# Patient Record
Sex: MALE | Race: WHITE | URBAN - METROPOLITAN AREA
[De-identification: names, ages, dates, MRNs, and addresses within clinical notes are randomized per-mention and may not be internally consistent; named-entity substitution may affect disease eponyms.]

---

## 2017-01-10 ENCOUNTER — OFFICE VISIT (OUTPATIENT)
Dept: FAMILY MEDICINE | Facility: CLINIC | Age: 72
End: 2017-01-10
Payer: COMMERCIAL

## 2017-01-10 VITALS
DIASTOLIC BLOOD PRESSURE: 78 MMHG | BODY MASS INDEX: 24.8 KG/M2 | TEMPERATURE: 96.6 F | SYSTOLIC BLOOD PRESSURE: 120 MMHG | HEART RATE: 71 BPM | WEIGHT: 156 LBS | OXYGEN SATURATION: 98 %

## 2017-01-10 DIAGNOSIS — Z23 NEED FOR VACCINATION WITH 13-POLYVALENT PNEUMOCOCCAL CONJUGATE VACCINE: ICD-10-CM

## 2017-01-10 DIAGNOSIS — Z71.89 ADVANCED DIRECTIVES, COUNSELING/DISCUSSION: ICD-10-CM

## 2017-01-10 DIAGNOSIS — E78.5 HYPERLIPIDEMIA WITH TARGET LDL LESS THAN 100: ICD-10-CM

## 2017-01-10 DIAGNOSIS — I10 ESSENTIAL HYPERTENSION WITH GOAL BLOOD PRESSURE LESS THAN 140/90: Primary | ICD-10-CM

## 2017-01-10 PROCEDURE — 99214 OFFICE O/P EST MOD 30 MIN: CPT | Mod: 25 | Performed by: FAMILY MEDICINE

## 2017-01-10 PROCEDURE — G0009 ADMIN PNEUMOCOCCAL VACCINE: HCPCS | Performed by: FAMILY MEDICINE

## 2017-01-10 PROCEDURE — 90670 PCV13 VACCINE IM: CPT | Performed by: FAMILY MEDICINE

## 2017-01-10 RX ORDER — HYDROCHLOROTHIAZIDE 25 MG/1
25 TABLET ORAL DAILY
Qty: 90 TABLET | Refills: 1 | Status: SHIPPED | OUTPATIENT
Start: 2017-01-10 | End: 2017-07-10

## 2017-01-10 RX ORDER — LOSARTAN POTASSIUM 25 MG/1
25 TABLET ORAL DAILY
Qty: 90 TABLET | Refills: 1 | Status: SHIPPED | OUTPATIENT
Start: 2017-01-10 | End: 2017-07-10

## 2017-01-10 RX ORDER — AMLODIPINE BESYLATE 10 MG/1
10 TABLET ORAL DAILY
Qty: 90 TABLET | Refills: 1 | Status: SHIPPED | OUTPATIENT
Start: 2017-01-10 | End: 2017-07-10

## 2017-01-10 RX ORDER — SIMVASTATIN 20 MG
20 TABLET ORAL AT BEDTIME
Qty: 90 TABLET | Refills: 1 | Status: SHIPPED | OUTPATIENT
Start: 2017-01-10 | End: 2017-07-10

## 2017-01-10 NOTE — NURSING NOTE
Screening Questionnaire for Adult Immunization    Are you sick today?   No   Do you have allergies to medications, food, a vaccine component or latex?   Yes   Have you ever had a serious reaction after receiving a vaccination?   No   Do you have a long-term health problem with heart disease, lung disease, asthma, kidney disease, metabolic disease (e.g. diabetes), anemia, or other blood disorder?  No   Do you have cancer, leukemia, HIV/AIDS, or any other immune system problem?   No   In the past 3 months, have you taken medications that affect  your immune system, such as prednisone, other steroids, or anticancer drugs; drugs for the treatment of rheumatoid arthritis, Crohn s disease, or psoriasis; or have you had radiation treatments?   No   Have you had a seizure, or a brain or other nervous system problem?   Cerebral Artery Occlusion with Infarction   During the past year, have you received a transfusion of blood or blood     products, or been given immune (gamma) globulin or antiviral drug?   No   For women: Are you pregnant or is there a chance you could become        pregnant during the next month?   No   Have you received any vaccinations in the past 4 weeks?   No     Immunization questionnaire was positive for at least one answer.  Dr. Carrillo aware. On patients problem list.      MNVFC doesn't apply on this patient    Per orders of Fullfillment injection of Prevnar 13 given by Fang Mcdaniels. Patient instructed to remain in clinic for 20 minutes afterwards, and to report any adverse reaction to me immediately.       Screening performed by Fang Mcdaniels on 1/10/2017 at 10:07 AM.

## 2017-01-10 NOTE — NURSING NOTE
"Chief Complaint   Patient presents with     Hypertension     Lipids       Initial /78 mmHg  Pulse 71  Temp(Src) 96.6  F (35.9  C) (Oral)  Wt 156 lb (70.761 kg)  SpO2 98% Estimated body mass index is 24.8 kg/(m^2) as calculated from the following:    Height as of 9/8/16: 5' 6.5\" (1.689 m).    Weight as of this encounter: 156 lb (70.761 kg).  BP completed using cuff size: regular      Fang Mcdaniels MA    "

## 2017-01-10 NOTE — PROGRESS NOTES
SUBJECTIVE:                                                    José Luis Harrison is a 71 year old male who presents to clinic today for the following health issues:      Hyperlipidemia Follow-Up      Rate your low fat/cholesterol diet?: not monitoring fat    Taking statin?  Yes, no muscle aches from statin     Other lipid medications/supplements?:  none     Hypertension Follow-up      Outpatient blood pressures are not being checked.    Low Salt Diet: low salt       Amount of exercise or physical activity: None    Problems taking medications regularly: No    Medication side effects: None    Diet: Regular (no restrictions)    SUBJECTIVE:   - Intermittent right sided shoulder pain last couple of weeks, with pain lasting around a minute. Has not tried anything to relieve the pain.     Problem list and histories reviewed & adjusted, as indicated.  Additional history: as documented    Problem list, Medication list, Allergies, and Medical/Social/Surgical histories reviewed in EPIC and updated as appropriate.    ROS:  Constitutional, HEENT, cardiovascular, pulmonary, GI, , musculoskeletal, neuro, skin, endocrine and psych systems are negative, except as otherwise noted.    OBJECTIVE:                                                    /78 mmHg  Pulse 71  Temp(Src) 96.6  F (35.9  C) (Oral)  Wt 156 lb (70.761 kg)  SpO2 98%  Body mass index is 24.8 kg/(m^2).  GENERAL: healthy, alert and no distress  EYES: Eyes grossly normal to inspection, PERRL and conjunctivae and sclerae normal  NECK: no adenopathy, no asymmetry, masses, or scars and thyroid normal to palpation  RESP: lungs clear to auscultation - no rales, rhonchi or wheezes  CV: regular rate and rhythm, normal S1 S2, no S3 or S4, no murmur, click or rub, no peripheral edema and peripheral pulses strong  ABDOMEN: soft, nontender, no hepatosplenomegaly, no masses and bowel sounds normal  MS: no gross musculoskeletal defects noted, no edema    Diagnostic Test  Results:  none      SUBJECTIVE:  71 year oldyear old male enters with a hx of hypretension.  Pt. Has been compliant with medications and medications were reviewed.  No side effects. No chest pain or sob. Low sodium diet.    Current outpatient prescriptions:      losartan (COZAAR) 25 MG tablet, Take 1 tablet (25 mg) by mouth daily, Disp: 90 tablet, Rfl: 1     amLODIPine (NORVASC) 10 MG tablet, Take 1 tablet (10 mg) by mouth daily, Disp: 90 tablet, Rfl: 1     simvastatin (ZOCOR) 20 MG tablet, Take 1 tablet (20 mg) by mouth At Bedtime, Disp: 90 tablet, Rfl: 1     hydrochlorothiazide (HYDRODIURIL) 25 MG tablet, Take 1 tablet (25 mg) by mouth daily, Disp: 90 tablet, Rfl: 1     cyclobenzaprine (FLEXERIL) 10 MG tablet, Take 0.5-1 tablets (5-10 mg) by mouth 3 times daily as needed for muscle spasms, Disp: 30 tablet, Rfl: 1     aspirin 325 MG EC tablet, Take 325 mg by mouth daily., Disp: , Rfl:      [DISCONTINUED] losartan (COZAAR) 25 MG tablet, Take 1 tablet (25 mg) by mouth daily, Disp: 90 tablet, Rfl: 1     [DISCONTINUED] amLODIPine (NORVASC) 10 MG tablet, Take 1 tablet (10 mg) by mouth daily, Disp: 90 tablet, Rfl: 1     [DISCONTINUED] simvastatin (ZOCOR) 20 MG tablet, Take 1 tablet (20 mg) by mouth At Bedtime, Disp: 90 tablet, Rfl: 1     [DISCONTINUED] hydrochlorothiazide (HYDRODIURIL) 25 MG tablet, Take 1 tablet (25 mg) by mouth daily, Disp: 90 tablet, Rfl: 1  Past Medical History   Diagnosis Date     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     Unspecified cerebral artery occlusion with cerebral infarction      TB (pulmonary tuberculosis) 2010     Hypertension goal BP (blood pressure) < 140/90 4/10/2013     Office Visit on 06/06/2016   Component Date Value Ref Range Status     Cholesterol 06/06/2016 176  <200 mg/dL Final     Triglycerides 06/06/2016 51  <150 mg/dL Final    Fasting specimen     HDL Cholesterol 06/06/2016 77  >39 mg/dL Final     LDL Cholesterol Calculated 06/06/2016 89  <100 mg/dL Final     Desirable:       <100 mg/dl     Non HDL Cholesterol 06/06/2016 99  <130 mg/dL Final     Creatinine Urine 06/06/2016 5   Final     Albumin Urine mg/L 06/06/2016 <5   Final     Albumin Urine mg/g Cr 06/06/2016 Unable to calculate due to low value  0 - 17 mg/g Cr Final     Sodium 06/06/2016 138  133 - 144 mmol/L Final     Potassium 06/06/2016 3.8  3.4 - 5.3 mmol/L Final     Chloride 06/06/2016 103  94 - 109 mmol/L Final     Carbon Dioxide 06/06/2016 26  20 - 32 mmol/L Final     Anion Gap 06/06/2016 9  3 - 14 mmol/L Final     Glucose 06/06/2016 102* 70 - 99 mg/dL Final     Urea Nitrogen 06/06/2016 21  7 - 30 mg/dL Final     Creatinine 06/06/2016 0.97  0.66 - 1.25 mg/dL Final     GFR Estimate 06/06/2016 77  >60 mL/min/1.7m2 Final    Non  GFR Calc     GFR Estimate If Black 06/06/2016   >60 mL/min/1.7m2 Final                    Value:>90   GFR Calc       Calcium 06/06/2016 8.7  8.5 - 10.1 mg/dL Final      Reviewed health maintenance  Patient Active Problem List   Diagnosis     Heart murmur     Corneal opacity     Advanced directives, counseling/discussion     Transient cerebral ischemia     Tuberculosis, treated     Hyperlipidemia with target LDL less than 100     Tobacco abuse     Cerebral artery occlusion with cerebral infarction (H)     TB (pulmonary tuberculosis)     Essential hypertension     Essential hypertension with goal blood pressure less than 140/90         OBJECTIVE:  no apparent distress  /78 mmHg  Pulse 71  Temp(Src) 96.6  F (35.9  C) (Oral)  Wt 156 lb (70.761 kg)  SpO2 98%     Head: Normocephalic. No masses, lesions, tenderness or abnormalities.  Neck::Neck supple. No adenopathy. Thyroid symmetric, normal size.    Cardiovascular: negative. No lifts, heaves, or thrills. RRR. No murmurs, clicks gallops or rubs  Respiratory. Good diaphragmatic excursion. Lungs clear  Gastrointestinal:Abdomen soft, non-tender.  No masses, organomegaly    Office Visit on 06/06/2016    Component Date Value Ref Range Status     Cholesterol 06/06/2016 176  <200 mg/dL Final     Triglycerides 06/06/2016 51  <150 mg/dL Final    Fasting specimen     HDL Cholesterol 06/06/2016 77  >39 mg/dL Final     LDL Cholesterol Calculated 06/06/2016 89  <100 mg/dL Final    Desirable:       <100 mg/dl     Non HDL Cholesterol 06/06/2016 99  <130 mg/dL Final     Creatinine Urine 06/06/2016 5   Final     Albumin Urine mg/L 06/06/2016 <5   Final     Albumin Urine mg/g Cr 06/06/2016 Unable to calculate due to low value  0 - 17 mg/g Cr Final     Sodium 06/06/2016 138  133 - 144 mmol/L Final     Potassium 06/06/2016 3.8  3.4 - 5.3 mmol/L Final     Chloride 06/06/2016 103  94 - 109 mmol/L Final     Carbon Dioxide 06/06/2016 26  20 - 32 mmol/L Final     Anion Gap 06/06/2016 9  3 - 14 mmol/L Final     Glucose 06/06/2016 102* 70 - 99 mg/dL Final     Urea Nitrogen 06/06/2016 21  7 - 30 mg/dL Final     Creatinine 06/06/2016 0.97  0.66 - 1.25 mg/dL Final     GFR Estimate 06/06/2016 77  >60 mL/min/1.7m2 Final    Non  GFR Calc     GFR Estimate If Black 06/06/2016   >60 mL/min/1.7m2 Final                    Value:>90   GFR Calc       Calcium 06/06/2016 8.7  8.5 - 10.1 mg/dL Final         ICD-10-CM    1. Essential hypertension with goal blood pressure less than 140/90 I10 losartan (COZAAR) 25 MG tablet     amLODIPine (NORVASC) 10 MG tablet     hydrochlorothiazide (HYDRODIURIL) 25 MG tablet   2. Hyperlipidemia with target LDL less than 100 E78.5 simvastatin (ZOCOR) 20 MG tablet    PLAN: Follow up in 6 months           SUBJECTIVE:  71 year old enters for recheck of high cholesterol.  Pt. Has been taking med and has no side effects. Pt is following diet.  Denies chest pain and SOB.  Past Medical History   Diagnosis Date     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     Unspecified cerebral artery occlusion with cerebral infarction      TB (pulmonary tuberculosis) 2010     Hypertension  goal BP (blood pressure) < 140/90 4/10/2013     Past Surgical History   Procedure Laterality Date     Surgical history of -   1975     PEPTIC ULCER     Abdomen surgery  1975     peptic ulcer surgery       Current outpatient prescriptions:      losartan (COZAAR) 25 MG tablet, Take 1 tablet (25 mg) by mouth daily, Disp: 90 tablet, Rfl: 1     amLODIPine (NORVASC) 10 MG tablet, Take 1 tablet (10 mg) by mouth daily, Disp: 90 tablet, Rfl: 1     simvastatin (ZOCOR) 20 MG tablet, Take 1 tablet (20 mg) by mouth At Bedtime, Disp: 90 tablet, Rfl: 1     hydrochlorothiazide (HYDRODIURIL) 25 MG tablet, Take 1 tablet (25 mg) by mouth daily, Disp: 90 tablet, Rfl: 1     cyclobenzaprine (FLEXERIL) 10 MG tablet, Take 0.5-1 tablets (5-10 mg) by mouth 3 times daily as needed for muscle spasms, Disp: 30 tablet, Rfl: 1     aspirin 325 MG EC tablet, Take 325 mg by mouth daily., Disp: , Rfl:      [DISCONTINUED] losartan (COZAAR) 25 MG tablet, Take 1 tablet (25 mg) by mouth daily, Disp: 90 tablet, Rfl: 1     [DISCONTINUED] amLODIPine (NORVASC) 10 MG tablet, Take 1 tablet (10 mg) by mouth daily, Disp: 90 tablet, Rfl: 1     [DISCONTINUED] simvastatin (ZOCOR) 20 MG tablet, Take 1 tablet (20 mg) by mouth At Bedtime, Disp: 90 tablet, Rfl: 1     [DISCONTINUED] hydrochlorothiazide (HYDRODIURIL) 25 MG tablet, Take 1 tablet (25 mg) by mouth daily, Disp: 90 tablet, Rfl: 1  Reviewed health maintenance   Patient Active Problem List   Diagnosis     Heart murmur     Corneal opacity     Advanced directives, counseling/discussion     Transient cerebral ischemia     Tuberculosis, treated     Hyperlipidemia with target LDL less than 100     Tobacco abuse     Cerebral artery occlusion with cerebral infarction (H)     TB (pulmonary tuberculosis)     Essential hypertension     Essential hypertension with goal blood pressure less than 140/90       OBJECTIVE:  no apparent distress  /78 mmHg  Pulse 71  Temp(Src) 96.6  F (35.9  C) (Oral)  Wt 156 lb (70.761  kg)  SpO2 98%      Exam:  Constitutional: healthy, alert and no distress  Head: Normocephalic. No masses, lesions, tenderness or abnormalities  Neck: Neck supple. No adenopathy. Thyroid symmetric, normal size,  Cardiovascular: negative, PMI normal. No lifts, heaves, or thrills. RRR. No murmurs, clicks gallops or rub  Respiratory: negative Lungs clear    Office Visit on 06/06/2016   Component Date Value Ref Range Status     Cholesterol 06/06/2016 176  <200 mg/dL Final     Triglycerides 06/06/2016 51  <150 mg/dL Final    Fasting specimen     HDL Cholesterol 06/06/2016 77  >39 mg/dL Final     LDL Cholesterol Calculated 06/06/2016 89  <100 mg/dL Final    Desirable:       <100 mg/dl     Non HDL Cholesterol 06/06/2016 99  <130 mg/dL Final     Creatinine Urine 06/06/2016 5   Final     Albumin Urine mg/L 06/06/2016 <5   Final     Albumin Urine mg/g Cr 06/06/2016 Unable to calculate due to low value  0 - 17 mg/g Cr Final     Sodium 06/06/2016 138  133 - 144 mmol/L Final     Potassium 06/06/2016 3.8  3.4 - 5.3 mmol/L Final     Chloride 06/06/2016 103  94 - 109 mmol/L Final     Carbon Dioxide 06/06/2016 26  20 - 32 mmol/L Final     Anion Gap 06/06/2016 9  3 - 14 mmol/L Final     Glucose 06/06/2016 102* 70 - 99 mg/dL Final     Urea Nitrogen 06/06/2016 21  7 - 30 mg/dL Final     Creatinine 06/06/2016 0.97  0.66 - 1.25 mg/dL Final     GFR Estimate 06/06/2016 77  >60 mL/min/1.7m2 Final    Non  GFR Calc     GFR Estimate If Black 06/06/2016   >60 mL/min/1.7m2 Final                    Value:>90   GFR Calc       Calcium 06/06/2016 8.7  8.5 - 10.1 mg/dL Final           ICD-10-CM    1. Essential hypertension with goal blood pressure less than 140/90 I10 losartan (COZAAR) 25 MG tablet     amLODIPine (NORVASC) 10 MG tablet     hydrochlorothiazide (HYDRODIURIL) 25 MG tablet   2. Hyperlipidemia with target LDL less than 100 E78.5 simvastatin (ZOCOR) 20 MG tablet    PLAN: Follow up in 6 months

## 2017-03-20 ENCOUNTER — OFFICE VISIT (OUTPATIENT)
Dept: FAMILY MEDICINE | Facility: CLINIC | Age: 72
End: 2017-03-20
Payer: COMMERCIAL

## 2017-03-20 VITALS
HEIGHT: 67 IN | WEIGHT: 161.8 LBS | OXYGEN SATURATION: 100 % | SYSTOLIC BLOOD PRESSURE: 134 MMHG | TEMPERATURE: 97.4 F | HEART RATE: 63 BPM | BODY MASS INDEX: 25.39 KG/M2 | DIASTOLIC BLOOD PRESSURE: 75 MMHG

## 2017-03-20 DIAGNOSIS — I63.50 CEREBRAL ARTERY OCCLUSION WITH CEREBRAL INFARCTION (H): ICD-10-CM

## 2017-03-20 DIAGNOSIS — M75.51 SUBDELTOID BURSITIS, RIGHT: Primary | ICD-10-CM

## 2017-03-20 PROCEDURE — 99213 OFFICE O/P EST LOW 20 MIN: CPT | Performed by: FAMILY MEDICINE

## 2017-03-20 NOTE — MR AVS SNAPSHOT
After Visit Summary   3/20/2017    José Luis Harrison    MRN: [id number]           Patient Information     Date Of Birth          1945        Visit Information        Provider Department      3/20/2017 8:45 AM Jose Carrillo MD [facility]        Today's Diagnoses     Subdeltoid bursitis, right    -  1    Cerebral artery occlusion with cerebral infarction (H)           Follow-ups after your visit        Your next 10 appointments already scheduled     Jul 10, 2017 10:00 AM CDT   Office Visit with Jose Carrillo MD   [facility] ([facility])    [address] [facility] [address] [phone]           Bring a current list of meds and any records pertaining to this visit.  For Physicals, please bring immunization records and any forms needing to be filled out.  Please arrive 10 minutes early to complete paperwork.              Who to contact     If you have questions or need follow up information about today's clinic visit or your schedule please contact [facility] directly at [phone].  Normal or non-critical lab and imaging results will be communicated to you by Tupalohart, letter or phone within 4 business days after the clinic has received the results. If you do not hear from us within 7 days, please contact the clinic through Fanergiest or phone. If you have a critical or abnormal lab result, we will notify you by phone as soon as possible.  Submit refill requests through [email or web address] or call your pharmacy and they will forward the refill request to us. Please allow 3 business days for your refill to be completed.          Additional Information About Your Visit        Tupalohart Information     [email or web address] gives you secure access to your electronic health record. If you see a primary care provider, you can also send messages to your care team and make appointments. If you have questions, please call your primary care clinic.  If you  "do not have a primary care provider, please call [phone] and they will assist you.        Care EveryWhere ID     This is your Care EveryWhere ID. This could be used by other organizations to access your [place] medical records  [id number]        Your Vitals Were     Pulse Temperature Height Pulse Oximetry BMI (Body Mass Index)       63 97.4  F (36.3  C) (Oral) 5' 6.5\" (1.689 m) 100% 25.72 kg/m2        Blood Pressure from Last 3 Encounters:   03/20/17 134/75   01/10/17 120/78   09/08/16 138/76    Weight from Last 3 Encounters:   03/20/17 161 lb 12.8 oz (73.4 kg)   01/10/17 156 lb (70.8 kg)   09/08/16 158 lb (71.7 kg)              Today, you had the following     No orders found for display       Primary Care Provider Office Phone # Fax #    Jose Carrillo [phone]       [facility] [address] [facility] [address]        Thank you!     Thank you for choosing [facility]  for your care. Our goal is always to provide you with excellent care. Hearing back from our patients is one way we can continue to improve our services. Please take a few minutes to complete the written survey that you may receive in the mail after your visit with us. Thank you!             Your Updated Medication List - Protect others around you: Learn how to safely use, store and throw away your medicines at www.disposemymeds.org.          This list is accurate as of: 3/20/17  9:00 AM.  Always use your most recent med list.                   Brand Name Dispense Instructions for use    amLODIPine 10 MG tablet    NORVASC    90 tablet    Take 1 tablet (10 mg) by mouth daily       aspirin 325 MG EC tablet      Take 325 mg by mouth daily.       hydrochlorothiazide 25 MG tablet    HYDRODIURIL    90 tablet    Take 1 tablet (25 mg) by mouth daily       losartan 25 MG tablet    COZAAR    90 tablet    Take 1 tablet (25 mg) by mouth daily       simvastatin 20 MG tablet    ZOCOR    90 tablet    " Take 1 tablet (20 mg) by mouth At Bedtime

## 2017-03-20 NOTE — NURSING NOTE
"Chief Complaint   Patient presents with     Musculoskeletal Problem     right arm pain from elbow radiating to shoulder, sharp pain, x 2 months.   Some help from aspercreme.  Some pain in neck        Initial /75  Pulse 63  Temp 97.4  F (36.3  C) (Oral)  Ht 5' 6.5\" (1.689 m)  Wt 161 lb 12.8 oz (73.4 kg)  SpO2 100%  BMI 25.72 kg/m2 Estimated body mass index is 25.72 kg/(m^2) as calculated from the following:    Height as of this encounter: 5' 6.5\" (1.689 m).    Weight as of this encounter: 161 lb 12.8 oz (73.4 kg).  Medication Reconciliation: complete  Nolan Rodriguez CMA    "

## 2017-03-20 NOTE — PROGRESS NOTES
"SUBJECTIVE:  71 year old.The patient has a history of right arm pain.  This started 2 months ago. Location between right arm and shoulder quality dull ache Associated symptoms are nothing.  Brought on by using it  .  Better with deep heat. ROS no chestpain shortness of breath.   No change over the last two months  Reviewed health maintenance  Patient Active Problem List   Diagnosis     Heart murmur     Corneal opacity     Advanced directives, counseling/discussion     Transient cerebral ischemia     Tuberculosis, treated     Hyperlipidemia with target LDL less than 100     Tobacco abuse     Cerebral artery occlusion with cerebral infarction (H)     TB (pulmonary tuberculosis)     Essential hypertension     Essential hypertension with goal blood pressure less than 140/90     Past Medical History   Diagnosis Date     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     Hypertension goal BP (blood pressure) < 140/90 4/10/2013     TB (pulmonary tuberculosis) 2010     Unspecified cerebral artery occlusion with cerebral infarction        OBJECTIVE:  no apparent distress  /75  Pulse 63  Temp 97.4  F (36.3  C) (Oral)  Ht 5' 6.5\" (1.689 m)  Wt 161 lb 12.8 oz (73.4 kg)  SpO2 100%  BMI 25.72 kg/m2   right shoulder full range of motion and elbow full range of motion worse with abduction.  No erythema  Point tenderness at deltoit     ICD-10-CM    1. Subdeltoid bursitis, right M75.51    2. Cerebral artery occlusion with cerebral infarction (H) I63.50     PLAN: exercises given and if not improving in two weeks then consider PT  The cerebral artery occlusion is stable and he has had no seizures or deficits      "

## 2017-03-28 ENCOUNTER — TELEPHONE (OUTPATIENT)
Dept: FAMILY MEDICINE | Facility: CLINIC | Age: 72
End: 2017-03-28

## 2017-03-28 NOTE — TELEPHONE ENCOUNTER
Panel Management Review      Patient has the following on his problem list:       IVD   ASA: Passed    Last LDL:    Lab Results   Component Value Date    CHOL 176 06/06/2016     Lab Results   Component Value Date    HDL 77 06/06/2016     Lab Results   Component Value Date    LDL 89 06/06/2016     Lab Results   Component Value Date    TRIG 51 06/06/2016        Lab Results   Component Value Date    CHOLHDLRATIO 2.1 06/08/2015        Is the patient on a Statin? YES   Is the patient on Aspirin? YES                  Medications     HMG CoA Reductase Inhibitors    simvastatin (ZOCOR) 20 MG tablet    Salicylates    aspirin 325 MG EC tablet          Last three blood pressure readings:  BP Readings from Last 3 Encounters:   03/20/17 134/75   01/10/17 120/78   09/08/16 138/76        Tobacco History:     History   Smoking Status     Current Every Day Smoker     Packs/day: 1.00     Years: 50.00     Last attempt to quit: 10/19/2011   Smokeless Tobacco     Never Used       Hypertension   Last three blood pressure readings:  BP Readings from Last 3 Encounters:   03/20/17 134/75   01/10/17 120/78   09/08/16 138/76     Blood pressure: Passed    HTN Guidelines:  Age 18-59 BP range:  Less than 140/90  Age 60-85 with Diabetes:  Less than 140/90  Age 60-85 without Diabetes:  less than 150/90      Composite cancer screening  Chart review shows that this patient is due/due soon for the following None  Summary:    Patient is due/failing the following:   Failing due to being a smoker    Action needed:   none    Type of outreach:    none    Questions for provider review:    None                                                                                                                                    Nolan Rodriguez CMA       Chart routed to closed .

## 2017-06-13 ENCOUNTER — TELEPHONE (OUTPATIENT)
Dept: FAMILY MEDICINE | Facility: CLINIC | Age: 72
End: 2017-06-13
Payer: COMMERCIAL

## 2017-06-13 DIAGNOSIS — Z87.891 HISTORY OF TOBACCO USE: Primary | ICD-10-CM

## 2017-06-13 PROCEDURE — G0296 VISIT TO DETERM LDCT ELIG: HCPCS | Performed by: FAMILY MEDICINE

## 2017-06-13 NOTE — TELEPHONE ENCOUNTER
Dr. Jose Carrillo;  Please complete orders for his yearly chest ct lung cancer screening (last done June 2016 -- to be done yearly)-- multiple issues when RN attempted to pend the orders--  After ordered send back to team to remind patient.  Tyesha Diego RN

## 2017-06-13 NOTE — TELEPHONE ENCOUNTER
Lung Cancer Screening Shared Decision Making Visit     José Luis Harrison is eligible for lung cancer screening on the basis of the information provided in my signed lung cancer screening order.     I have discussed with patient the risks and benefits of screening for lung cancer with low-dose CT.     The risks include:   radiation exposure    false positives     over-diagnosis    The benefit of early detection of lung cancer is contingent upon adherence to annual screening or more frequent follow up if indicated.     Furthermore, reaping the benefits of screening requires José Luis Harrison to be willing and physically able to undergo diagnostic procedures, if indicated. Although no specific guide is available for determining severity of comorbidities, it is reasonable to withhold screening in patients who have greater mortality risk from other diseases.     We did discuss that the only way to prevent lung cancer is to not smoke. Smoking cessation assistance was offered.    I did offer risk estimation using a calculator such as this one:    ShouldIScreen

## 2017-06-13 NOTE — PATIENT INSTRUCTIONS

## 2017-06-14 ENCOUNTER — TELEPHONE (OUTPATIENT)
Dept: FAMILY MEDICINE | Facility: CLINIC | Age: 72
End: 2017-06-14

## 2017-07-10 ENCOUNTER — OFFICE VISIT (OUTPATIENT)
Dept: FAMILY MEDICINE | Facility: CLINIC | Age: 72
End: 2017-07-10
Payer: COMMERCIAL

## 2017-07-10 VITALS
WEIGHT: 155.4 LBS | BODY MASS INDEX: 24.71 KG/M2 | TEMPERATURE: 97.4 F | DIASTOLIC BLOOD PRESSURE: 70 MMHG | OXYGEN SATURATION: 100 % | SYSTOLIC BLOOD PRESSURE: 121 MMHG | HEART RATE: 62 BPM

## 2017-07-10 DIAGNOSIS — I63.50 CEREBRAL ARTERY OCCLUSION WITH CEREBRAL INFARCTION (H): Primary | ICD-10-CM

## 2017-07-10 DIAGNOSIS — E78.5 HYPERLIPIDEMIA WITH TARGET LDL LESS THAN 100: ICD-10-CM

## 2017-07-10 DIAGNOSIS — I10 ESSENTIAL HYPERTENSION WITH GOAL BLOOD PRESSURE LESS THAN 140/90: ICD-10-CM

## 2017-07-10 LAB
CHOLEST SERPL-MCNC: 172 MG/DL
CREAT UR-MCNC: 76 MG/DL
HDLC SERPL-MCNC: 77 MG/DL
LDLC SERPL CALC-MCNC: 88 MG/DL
MICROALBUMIN UR-MCNC: <5 MG/L
MICROALBUMIN/CREAT UR: NORMAL MG/G CR (ref 0–17)
NONHDLC SERPL-MCNC: 95 MG/DL
TRIGL SERPL-MCNC: 37 MG/DL

## 2017-07-10 PROCEDURE — 99214 OFFICE O/P EST MOD 30 MIN: CPT | Performed by: FAMILY MEDICINE

## 2017-07-10 PROCEDURE — 80061 LIPID PANEL: CPT | Performed by: FAMILY MEDICINE

## 2017-07-10 PROCEDURE — 36415 COLL VENOUS BLD VENIPUNCTURE: CPT | Performed by: FAMILY MEDICINE

## 2017-07-10 PROCEDURE — 82043 UR ALBUMIN QUANTITATIVE: CPT | Performed by: FAMILY MEDICINE

## 2017-07-10 RX ORDER — HYDROCHLOROTHIAZIDE 25 MG/1
25 TABLET ORAL DAILY
Qty: 90 TABLET | Refills: 1 | Status: SHIPPED | OUTPATIENT
Start: 2017-07-10 | End: 2018-02-20

## 2017-07-10 RX ORDER — SIMVASTATIN 20 MG
20 TABLET ORAL AT BEDTIME
Qty: 90 TABLET | Refills: 1 | Status: SHIPPED | OUTPATIENT
Start: 2017-07-10 | End: 2018-02-20

## 2017-07-10 RX ORDER — LOSARTAN POTASSIUM 25 MG/1
25 TABLET ORAL DAILY
Qty: 90 TABLET | Refills: 1 | Status: SHIPPED | OUTPATIENT
Start: 2017-07-10 | End: 2018-02-20

## 2017-07-10 RX ORDER — AMLODIPINE BESYLATE 10 MG/1
10 TABLET ORAL DAILY
Qty: 90 TABLET | Refills: 1 | Status: SHIPPED | OUTPATIENT
Start: 2017-07-10 | End: 2018-02-20

## 2017-07-10 NOTE — PROGRESS NOTES
SUBJECTIVE:  71 year oldyear old male enters with a hx of hypretension.  Pt. Has been compliant with medications and medications were reviewed.  No side effects. No chest pain or sob. Low sodium diet.    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     Hypertension goal BP (blood pressure) < 140/90 4/10/2013     TB (pulmonary tuberculosis) 2010     Unspecified cerebral artery occlusion with cerebral infarction      Office Visit on 06/06/2016   Component Date Value Ref Range Status     Cholesterol 06/06/2016 176  <200 mg/dL Final     Triglycerides 06/06/2016 51  <150 mg/dL Final    Fasting specimen     HDL Cholesterol 06/06/2016 77  >39 mg/dL Final     LDL Cholesterol Calculated 06/06/2016 89  <100 mg/dL Final    Desirable:       <100 mg/dl     Non HDL Cholesterol 06/06/2016 99  <130 mg/dL Final     Creatinine Urine 06/06/2016 5  mg/dL Final     Albumin Urine mg/L 06/06/2016 <5  mg/L Final     Albumin Urine mg/g Cr 06/06/2016 Unable to calculate due to low value  0 - 17 mg/g Cr Final     Sodium 06/06/2016 138  133 - 144 mmol/L Final     Potassium 06/06/2016 3.8  3.4 - 5.3 mmol/L Final     Chloride 06/06/2016 103  94 - 109 mmol/L Final     Carbon Dioxide 06/06/2016 26  20 - 32 mmol/L Final     Anion Gap 06/06/2016 9  3 - 14 mmol/L Final     Glucose 06/06/2016 102* 70 - 99 mg/dL Final     Urea Nitrogen 06/06/2016 21  7 - 30 mg/dL Final      Creatinine 06/06/2016 0.97  0.66 - 1.25 mg/dL Final     GFR Estimate 06/06/2016 77  >60 mL/min/1.7m2 Final    Non  GFR Calc     GFR Estimate If Black 06/06/2016   >60 mL/min/1.7m2 Final                    Value:>90   GFR Calc       Calcium 06/06/2016 8.7  8.5 - 10.1 mg/dL Final      Reviewed health maintenance  Patient Active Problem List   Diagnosis     Heart murmur     Corneal opacity     Advanced directives, counseling/discussion     Transient cerebral ischemia     Tuberculosis, treated     Hyperlipidemia with target LDL less than 100     Tobacco abuse     Cerebral artery occlusion with cerebral infarction (H)     TB (pulmonary tuberculosis)     Essential hypertension     Essential hypertension with goal blood pressure less than 140/90         OBJECTIVE:  no apparent distress  /70  Pulse 62  Temp 97.4  F (36.3  C) (Oral)  Wt 155 lb 6.4 oz (70.5 kg)  SpO2 100%  BMI 24.71 kg/m2     Head: Normocephalic. No masses, lesions, tenderness or abnormalities.  Neck::Neck supple. No adenopathy. Thyroid symmetric, normal size.    Cardiovascular: negative. No lifts, heaves, or thrills. RRR. No murmurs, clicks gallops or rubs  Respiratory. Good diaphragmatic excursion. Lungs clear  Gastrointestinal:Abdomen soft, non-tender.  No masses, organomegaly    Office Visit on 06/06/2016   Component Date Value Ref Range Status     Cholesterol 06/06/2016 176  <200 mg/dL Final     Triglycerides 06/06/2016 51  <150 mg/dL Final    Fasting specimen     HDL Cholesterol 06/06/2016 77  >39 mg/dL Final     LDL Cholesterol Calculated 06/06/2016 89  <100 mg/dL Final    Desirable:       <100 mg/dl     Non HDL Cholesterol 06/06/2016 99  <130 mg/dL Final     Creatinine Urine 06/06/2016 5  mg/dL Final     Albumin Urine mg/L 06/06/2016 <5  mg/L Final     Albumin Urine mg/g Cr 06/06/2016 Unable to calculate due to low value  0 - 17 mg/g Cr Final     Sodium 06/06/2016 138  133 - 144 mmol/L Final     Potassium  06/06/2016 3.8  3.4 - 5.3 mmol/L Final     Chloride 06/06/2016 103  94 - 109 mmol/L Final     Carbon Dioxide 06/06/2016 26  20 - 32 mmol/L Final     Anion Gap 06/06/2016 9  3 - 14 mmol/L Final     Glucose 06/06/2016 102* 70 - 99 mg/dL Final     Urea Nitrogen 06/06/2016 21  7 - 30 mg/dL Final     Creatinine 06/06/2016 0.97  0.66 - 1.25 mg/dL Final     GFR Estimate 06/06/2016 77  >60 mL/min/1.7m2 Final    Non  GFR Calc     GFR Estimate If Black 06/06/2016   >60 mL/min/1.7m2 Final                    Value:>90   GFR Calc       Calcium 06/06/2016 8.7  8.5 - 10.1 mg/dL Final         ICD-10-CM    1. Cerebral artery occlusion with cerebral infarction (H) I63.50    2. Essential hypertension with goal blood pressure less than 140/90 I10 losartan (COZAAR) 25 MG tablet     amLODIPine (NORVASC) 10 MG tablet     hydrochlorothiazide (HYDRODIURIL) 25 MG tablet   3. Hyperlipidemia with target LDL less than 100 E78.5 simvastatin (ZOCOR) 20 MG tablet    PLAN: Follow up in 6 months             SUBJECTIVE:  71 year old enters for recheck of high cholesterol.  Pt. Has been taking med and has no side effects. Pt is following diet.  Denies chest pain and SOB.  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     Hypertension goal BP (blood pressure) < 140/90 4/10/2013     TB (pulmonary tuberculosis) 2010     Unspecified cerebral artery occlusion with cerebral infarction      Past Surgical History:   Procedure Laterality Date     ABDOMEN SURGERY  1975    peptic ulcer surgery     SURGICAL HISTORY OF -   1975    PEPTIC ULCER       Current Outpatient Prescriptions:      losartan (COZAAR) 25 MG tablet, Take 1 tablet (25 mg) by mouth daily, Disp: 90 tablet, Rfl: 1     amLODIPine (NORVASC) 10 MG tablet, Take 1 tablet (10 mg) by mouth daily, Disp: 90 tablet, Rfl: 1     simvastatin (ZOCOR) 20 MG tablet, Take 1 tablet (20 mg) by mouth At Bedtime, Disp: 90 tablet, Rfl:  1     hydrochlorothiazide (HYDRODIURIL) 25 MG tablet, Take 1 tablet (25 mg) by mouth daily, Disp: 90 tablet, Rfl: 1     aspirin 325 MG EC tablet, Take 325 mg by mouth daily., Disp: , Rfl:   Reviewed health maintenance   Patient Active Problem List   Diagnosis     Heart murmur     Corneal opacity     Advanced directives, counseling/discussion     Transient cerebral ischemia     Tuberculosis, treated     Hyperlipidemia with target LDL less than 100     Tobacco abuse     Cerebral artery occlusion with cerebral infarction (H)     TB (pulmonary tuberculosis)     Essential hypertension     Essential hypertension with goal blood pressure less than 140/90       OBJECTIVE:  no apparent distress  /70  Pulse 62  Temp 97.4  F (36.3  C) (Oral)  Wt 155 lb 6.4 oz (70.5 kg)  SpO2 100%  BMI 24.71 kg/m2      Exam:  Constitutional: healthy, alert and no distress  Head: Normocephalic. No masses, lesions, tenderness or abnormalities  Neck: Neck supple. No adenopathy. Thyroid symmetric, normal size,  Cardiovascular: negative, PMI normal. No lifts, heaves, or thrills. RRR. No murmurs, clicks gallops or rub  Respiratory: negative Lungs clear    Office Visit on 06/06/2016   Component Date Value Ref Range Status     Cholesterol 06/06/2016 176  <200 mg/dL Final     Triglycerides 06/06/2016 51  <150 mg/dL Final    Fasting specimen     HDL Cholesterol 06/06/2016 77  >39 mg/dL Final     LDL Cholesterol Calculated 06/06/2016 89  <100 mg/dL Final    Desirable:       <100 mg/dl     Non HDL Cholesterol 06/06/2016 99  <130 mg/dL Final     Creatinine Urine 06/06/2016 5  mg/dL Final     Albumin Urine mg/L 06/06/2016 <5  mg/L Final     Albumin Urine mg/g Cr 06/06/2016 Unable to calculate due to low value  0 - 17 mg/g Cr Final     Sodium 06/06/2016 138  133 - 144 mmol/L Final     Potassium 06/06/2016 3.8  3.4 - 5.3 mmol/L Final     Chloride 06/06/2016 103  94 - 109 mmol/L Final     Carbon Dioxide 06/06/2016 26  20 - 32 mmol/L Final     Anion  Gap 06/06/2016 9  3 - 14 mmol/L Final     Glucose 06/06/2016 102* 70 - 99 mg/dL Final     Urea Nitrogen 06/06/2016 21  7 - 30 mg/dL Final     Creatinine 06/06/2016 0.97  0.66 - 1.25 mg/dL Final     GFR Estimate 06/06/2016 77  >60 mL/min/1.7m2 Final    Non  GFR Calc     GFR Estimate If Black 06/06/2016   >60 mL/min/1.7m2 Final                    Value:>90   GFR Calc       Calcium 06/06/2016 8.7  8.5 - 10.1 mg/dL Final           ICD-10-CM    1. Cerebral artery occlusion with cerebral infarction (H) I63.50    2. Essential hypertension with goal blood pressure less than 140/90 I10 losartan (COZAAR) 25 MG tablet     amLODIPine (NORVASC) 10 MG tablet     hydrochlorothiazide (HYDRODIURIL) 25 MG tablet   3. Hyperlipidemia with target LDL less than 100 E78.5 simvastatin (ZOCOR) 20 MG tablet    PLAN: Follow up in 1 year

## 2017-07-10 NOTE — NURSING NOTE
"Chief Complaint   Patient presents with     Hypertension       Initial /70  Pulse 62  Temp 97.4  F (36.3  C) (Oral)  Wt 155 lb 6.4 oz (70.5 kg)  SpO2 100%  BMI 24.71 kg/m2 Estimated body mass index is 24.71 kg/(m^2) as calculated from the following:    Height as of 3/20/17: 5' 6.5\" (1.689 m).    Weight as of this encounter: 155 lb 6.4 oz (70.5 kg).  Medication Reconciliation: complete  Nolan Rodriguez CMA    "

## 2017-07-11 ENCOUNTER — RADIANT APPOINTMENT (OUTPATIENT)
Dept: CT IMAGING | Facility: CLINIC | Age: 72
End: 2017-07-11
Attending: FAMILY MEDICINE
Payer: COMMERCIAL

## 2017-07-11 DIAGNOSIS — Z87.891 HISTORY OF TOBACCO USE: ICD-10-CM

## 2017-07-11 PROCEDURE — G0297 LDCT FOR LUNG CA SCREEN: HCPCS | Mod: TC

## 2017-07-13 ENCOUNTER — TELEPHONE (OUTPATIENT)
Dept: FAMILY MEDICINE | Facility: CLINIC | Age: 72
End: 2017-07-13

## 2017-07-13 NOTE — TELEPHONE ENCOUNTER
Panel Management Review      Patient has the following on his problem list:       IVD   ASA: Passed    Last LDL:    Lab Results   Component Value Date    CHOL 172 07/10/2017     Lab Results   Component Value Date    HDL 77 07/10/2017     Lab Results   Component Value Date    LDL 88 07/10/2017     Lab Results   Component Value Date    TRIG 37 07/10/2017        Lab Results   Component Value Date    CHOLHDLRATIO 2.1 06/08/2015        Is the patient on a Statin? YES   Is the patient on Aspirin? YES                  Medications     HMG CoA Reductase Inhibitors    simvastatin (ZOCOR) 20 MG tablet    Salicylates    aspirin 325 MG EC tablet          Last three blood pressure readings:  BP Readings from Last 3 Encounters:   07/10/17 121/70   03/20/17 134/75   01/10/17 120/78        Tobacco History:     History   Smoking Status     Current Every Day Smoker     Packs/day: 1.00     Years: 50.00     Last attempt to quit: 10/19/2011   Smokeless Tobacco     Never Used       Hypertension   Last three blood pressure readings:  BP Readings from Last 3 Encounters:   07/10/17 121/70   03/20/17 134/75   01/10/17 120/78     Blood pressure: Passed    HTN Guidelines:  Age 18-59 BP range:  Less than 140/90  Age 60-85 with Diabetes:  Less than 140/90  Age 60-85 without Diabetes:  less than 150/90      Composite cancer screening  Chart review shows that this patient is due/due soon for the following None  Summary:    Patient is due/failing the following:   Patient is a smoker      Action needed:   None      Type of outreach:    none    Questions for provider review:    None                                                                                                                                    Nolan Rodriguez CMA       Chart routed to closed .

## 2017-09-18 ENCOUNTER — OFFICE VISIT (OUTPATIENT)
Dept: OPTOMETRY | Facility: CLINIC | Age: 72
End: 2017-09-18
Payer: COMMERCIAL

## 2017-09-18 DIAGNOSIS — H52.13 MYOPIA OF BOTH EYES: ICD-10-CM

## 2017-09-18 DIAGNOSIS — H25.13 NUCLEAR SCLEROTIC CATARACT OF BOTH EYES: Primary | ICD-10-CM

## 2017-09-18 DIAGNOSIS — H52.223 REGULAR ASTIGMATISM OF BOTH EYES: ICD-10-CM

## 2017-09-18 DIAGNOSIS — H52.4 PRESBYOPIA: ICD-10-CM

## 2017-09-18 PROCEDURE — 92015 DETERMINE REFRACTIVE STATE: CPT | Performed by: OPTOMETRIST

## 2017-09-18 PROCEDURE — 92014 COMPRE OPH EXAM EST PT 1/>: CPT | Performed by: OPTOMETRIST

## 2017-09-18 ASSESSMENT — EXTERNAL EXAM - LEFT EYE: OS_EXAM: NORMAL

## 2017-09-18 ASSESSMENT — CUP TO DISC RATIO
OS_RATIO: 0.1
OD_RATIO: 0.1

## 2017-09-18 ASSESSMENT — VISUAL ACUITY
OD_CC: 20/25
OS_CC: 20/20-1
OD_CC: 20/40
OS_CC: 20/30
METHOD: SNELLEN - LINEAR
CORRECTION_TYPE: GLASSES
OD_CC+: -2
OS_CC+: -2

## 2017-09-18 ASSESSMENT — REFRACTION_MANIFEST
OS_AXIS: 165
METHOD_AUTOREFRACTION: 1
OD_AXIS: 023
OS_SPHERE: -3.50
OS_SPHERE: -3.00
OD_SPHERE: -3.25
OS_CYLINDER: +2.00
OD_AXIS: 020
OS_ADD: +3.00
OS_AXIS: 167
OD_ADD: +3.00
OD_SPHERE: -3.75
OD_CYLINDER: +2.00
OS_CYLINDER: +2.75
OD_CYLINDER: +1.50

## 2017-09-18 ASSESSMENT — TONOMETRY
OS_IOP_MMHG: 20
IOP_METHOD: APPLANATION
OD_IOP_MMHG: 20

## 2017-09-18 ASSESSMENT — REFRACTION_WEARINGRX
OS_SPHERE: -2.25
OS_ADD: +3.00
OS_AXIS: 170
OS_CYLINDER: +1.50
SPECS_TYPE: PAL
OD_AXIS: 015
OD_SPHERE: -2.00
OD_ADD: +3.00
OD_CYLINDER: +1.75

## 2017-09-18 ASSESSMENT — EXTERNAL EXAM - RIGHT EYE: OD_EXAM: NORMAL

## 2017-09-18 ASSESSMENT — KERATOMETRY
OS_AXISANGLE2_DEGREES: 168
OS_K2POWER_DIOPTERS: 43.75
OS_K1POWER_DIOPTERS: 46.25
OD_K1POWER_DIOPTERS: 45.50
OD_AXISANGLE2_DEGREES: 15
OD_K2POWER_DIOPTERS: 43.75

## 2017-09-18 ASSESSMENT — CONF VISUAL FIELD
OD_NORMAL: 1
METHOD: COUNTING FINGERS
OS_NORMAL: 1

## 2017-09-18 ASSESSMENT — SLIT LAMP EXAM - LIDS
COMMENTS: NORMAL
COMMENTS: NORMAL

## 2017-09-18 NOTE — MR AVS SNAPSHOT
After Visit Summary   9/18/2017    José Luis Harrison    MRN: [id number]           Patient Information     Date Of Birth          1945        Visit Information        Provider Department      9/18/2017 1:00 PM Jessica Guerrero, [facility]        Today's Diagnoses     Myopia of both eyes    -  1    Presbyopia        Regular astigmatism of both eyes          Care Instructions    Patient was advised of today's exam findings.  Fill glasses prescription  Allow 2 weeks to adapt to change in glasses  Monitor mild cataracts   Return in 1 year for eye exam and as needed     Jessica Guerrero O.D.  [facility]   [address] [place] [address]  [phone]            Follow-ups after your visit        Who to contact     If you have questions or need follow up information about today's clinic visit or your schedule please contact [facility] directly at [phone].  Normal or non-critical lab and imaging results will be communicated to you by MyChart, letter or phone within 4 business days after the clinic has received the results. If you do not hear from us within 7 days, please contact the clinic through [employer] or phone. If you have a critical or abnormal lab result, we will notify you by phone as soon as possible.  Submit refill requests through [employer] or call your pharmacy and they will forward the refill request to us. Please allow 3 business days for your refill to be completed.          Additional Information About Your Visit        MyChart Information     [employer] gives you secure access to your electronic health record. If you see a primary care provider, you can also send messages to your care team and make appointments. If you have questions, please call your primary care clinic.  If you do not have a primary care provider, please call [phone] and they will assist you.        Care EveryWhere ID     This is your Care EveryWhere ID. This could be  used by other organizations to access your [facility] medical records  [id number]         Blood Pressure from Last 3 Encounters:   07/10/17 121/70   03/20/17 134/75   01/10/17 120/78    Weight from Last 3 Encounters:   07/10/17 70.5 kg (155 lb 6.4 oz)   03/20/17 73.4 kg (161 lb 12.8 oz)   01/10/17 70.8 kg (156 lb)              Today, you had the following     No orders found for display       Primary Care Provider Office Phone # Fax #    Jose Karsten Carrillo [phone] [address] [facility] [address]        Equal Access to Services     RIOS MIR : Yanira nicholso Sodean, waaxda honorio, qaybta kaalmada rui, kenyatta mansfield. So [facility] [phone].    ATENCIÓN: Si habla español, tiene a ness disposición servicios gratuitos de asistencia lingüística. Llame al [phone].    We comply with applicable federal civil rights laws and [place] laws. We do not discriminate on the basis of race, color, national origin, age, disability sex, sexual orientation or gender identity.            Thank you!     Thank you for choosing [facility] [place]  for your care. Our goal is always to provide you with excellent care. Hearing back from our patients is one way we can continue to improve our services. Please take a few minutes to complete the written survey that you may receive in the mail after your visit with us. Thank you!             Your Updated Medication List - Protect others around you: Learn how to safely use, store and throw away your medicines at www.disposemymeds.org.          This list is accurate as of: 9/18/17  3:04 PM.  Always use your most recent med list.                   Brand Name Dispense Instructions for use Diagnosis    amLODIPine 10 MG tablet    NORVASC    90 tablet    Take 1 tablet (10 mg) by mouth daily    Essential hypertension with goal blood pressure less than 140/90       aspirin 325 MG EC tablet      Take 325 mg by mouth  daily.        hydrochlorothiazide 25 MG tablet    HYDRODIURIL    90 tablet    Take 1 tablet (25 mg) by mouth daily    Essential hypertension with goal blood pressure less than 140/90       losartan 25 MG tablet    COZAAR    90 tablet    Take 1 tablet (25 mg) by mouth daily    Essential hypertension with goal blood pressure less than 140/90       simvastatin 20 MG tablet    ZOCOR    90 tablet    Take 1 tablet (20 mg) by mouth At Bedtime    Hyperlipidemia with target LDL less than 100

## 2017-09-18 NOTE — PROGRESS NOTES
Chief Complaint   Patient presents with     COMPREHENSIVE EYE EXAM     yearly         Last Eye Exam: 3/2/2016  Dilated Previously: Yes    What are you currently using to see?  Glasses, wears glasses all of the time       Distance Vision Acuity: Noticed gradual change in both eyes, changes in the way he see the TV, has to strain     Near Vision Acuity: Satisfied with vision while reading and using computer with glasses    Eye Comfort: good  Do you use eye drops? 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       OBJECTIVE: See Ophthalmology exam    ASSESSMENT:    ICD-10-CM    1. Nuclear sclerotic cataract of both eyes H25.13 EYE EXAM (SIMPLE-NONBILLABLE)     REFRACTION   2. Myopia of both eyes H52.13 EYE EXAM (SIMPLE-NONBILLABLE)     REFRACTION   3. Presbyopia H52.4 EYE EXAM (SIMPLE-NONBILLABLE)     REFRACTION   4. Regular astigmatism of both eyes H52.223 EYE EXAM (SIMPLE-NONBILLABLE)     REFRACTION      PLAN:     Patient Instructions   Patient was advised of today's exam findings.  Fill glasses prescription  Allow 2 weeks to adapt to change in glasses  Monitor mild cataracts   Return in 1 year for eye exam and as needed     Jessica Guerrero O.D.  [facility]   [address] [place] [address] [phone]

## 2017-09-18 NOTE — PATIENT INSTRUCTIONS
Patient was advised of today's exam findings.  Fill glasses prescription  Allow 2 weeks to adapt to change in glasses  Monitor mild cataracts   Return in 1 year for eye exam and as needed     Jessica Guerrero O.D.  [facility]   [address] [place], [place] [address] [phone]

## 2017-10-25 ENCOUNTER — TELEPHONE (OUTPATIENT)
Dept: FAMILY MEDICINE | Facility: CLINIC | Age: 72
End: 2017-10-25

## 2017-10-25 NOTE — TELEPHONE ENCOUNTER
Panel Management Review      Patient has the following on his problem list:       IVD   ASA: Passed    Last LDL:    Lab Results   Component Value Date    CHOL 172 07/10/2017     Lab Results   Component Value Date    HDL 77 07/10/2017     Lab Results   Component Value Date    LDL 88 07/10/2017     Lab Results   Component Value Date    TRIG 37 07/10/2017        Lab Results   Component Value Date    CHOLHDLRATIO 2.1 06/08/2015        Is the patient on a Statin? YES   Is the patient on Aspirin? YES                  Medications     HMG CoA Reductase Inhibitors    simvastatin (ZOCOR) 20 MG tablet    Salicylates    aspirin 325 MG EC tablet          Last three blood pressure readings:  BP Readings from Last 3 Encounters:   07/10/17 121/70   03/20/17 134/75   01/10/17 120/78        Tobacco History:     History   Smoking Status     Current Every Day Smoker     Packs/day: 1.00     Years: 50.00     Last attempt to quit: 10/19/2011   Smokeless Tobacco     Never Used       Hypertension   Last three blood pressure readings:  BP Readings from Last 3 Encounters:   07/10/17 121/70   03/20/17 134/75   01/10/17 120/78     Blood pressure: Passed    HTN Guidelines:  Age 18-59 BP range:  Less than 140/90  Age 60-85 with Diabetes:  Less than 140/90  Age 60-85 without Diabetes:  less than 150/90          Composite cancer screening  Chart review shows that this patient is due/due soon for the following None  Summary:    Patient is due/failing the following:   Patient failing due to being a smoker     Action needed:   None      Type of outreach:    None    Questions for provider review:    None                                                                                                                                    Nolan Rodriguez CMA       Chart routed to closed .

## 2018-01-30 ENCOUNTER — TELEPHONE (OUTPATIENT)
Dept: FAMILY MEDICINE | Facility: CLINIC | Age: 73
End: 2018-01-30

## 2018-01-30 NOTE — TELEPHONE ENCOUNTER
Panel Management Review      Patient has the following on his problem list:       IVD   ASA: Passed    Last LDL:    Lab Results   Component Value Date    CHOL 172 07/10/2017     Lab Results   Component Value Date    HDL 77 07/10/2017     Lab Results   Component Value Date    LDL 88 07/10/2017     Lab Results   Component Value Date    TRIG 37 07/10/2017        Lab Results   Component Value Date    CHOLHDLRATIO 2.1 06/08/2015        Is the patient on a Statin? YES   Is the patient on Aspirin? YES                  Medications     HMG CoA Reductase Inhibitors    simvastatin (ZOCOR) 20 MG tablet    Salicylates    aspirin 325 MG EC tablet          Last three blood pressure readings:  BP Readings from Last 3 Encounters:   07/10/17 121/70   03/20/17 134/75   01/10/17 120/78        Tobacco History:     History   Smoking Status     Current Every Day Smoker     Packs/day: 1.00     Years: 50.00     Last attempt to quit: 10/19/2011   Smokeless Tobacco     Never Used       Hypertension   Last three blood pressure readings:  BP Readings from Last 3 Encounters:   07/10/17 121/70   03/20/17 134/75   01/10/17 120/78     Blood pressure: Passed    HTN Guidelines:  Age 18-59 BP range:  Less than 140/90  Age 60-85 with Diabetes:  Less than 140/90  Age 60-85 without Diabetes:  less than 150/90      Composite cancer screening  Chart review shows that this patient is due/due soon for the following None  Summary:    Patient is due/failing the following:   Patient failing due to being a smoker    Action needed:   Patient needs office visit for blood pressure follow up due 6 months from last visit .    Type of outreach:    Sent letter.    Questions for provider review:    None                                                                                                                                    Nolan Rodriguez CMA       Chart routed to closed .

## 2018-01-30 NOTE — LETTER
[facility] [place]  [address] [facility] [address]  Phone: [phone]    01/30/18    José Luis VOGEL Hunter  [address] [place] [address]      To whom it may concern:     Our records indicate that you have not scheduled for a(n)appointment with  Jose Carrillo MD for a blood pressure check which was recommended by your health care team. Monitoring and managing your preventative and chronic health conditions are very important to us.     If you have received your health care elsewhere, please provide us with that information so it can be documented in your chart.    Please call [phone] or message us through your [employer] account to schedule an appointment or provide information for your chart.     I look forward to seeing you and working with you on your health care needs.       *If you have already scheduled an appointment, please disregard this reminder      Sincerely,      Jose Carrillo MD/mala

## 2018-02-20 DIAGNOSIS — E78.5 HYPERLIPIDEMIA WITH TARGET LDL LESS THAN 100: ICD-10-CM

## 2018-02-20 DIAGNOSIS — I10 ESSENTIAL HYPERTENSION WITH GOAL BLOOD PRESSURE LESS THAN 140/90: ICD-10-CM

## 2018-02-20 NOTE — LETTER
February 23, 2018    José Luis Harrison  [address] [place] [address]  [place] [address]    Dear José Luis,       We recently received a refill request for losartan (COZAAR) 25 MG tablet, simvastatin (ZOCOR) 20 MG tablet, amLODIPine (NORVASC) 10 MG tablet, hydrochlorothiazide (HYDRODIURIL) 25 MG tablet.  We have refilled this for a one time 30 day supply only because you are due for a:    Blood Pressue / Cholesterol office visit and Fasting lab appointment      Please schedule this lab appointment 4-5 days prior to the office visit.     Please call at your earliest convenience so that there will not be a delay with your future refills.          Thank you,   Your [facility] Team/ks  [phone]

## 2018-02-22 RX ORDER — LOSARTAN POTASSIUM 25 MG/1
TABLET ORAL
Qty: 30 TABLET | Refills: 0 | Status: SHIPPED | OUTPATIENT
Start: 2018-02-22 | End: 2018-02-28

## 2018-02-22 RX ORDER — HYDROCHLOROTHIAZIDE 25 MG/1
TABLET ORAL
Qty: 30 TABLET | Refills: 0 | Status: SHIPPED | OUTPATIENT
Start: 2018-02-22 | End: 2018-02-28

## 2018-02-22 RX ORDER — AMLODIPINE BESYLATE 10 MG/1
TABLET ORAL
Qty: 30 TABLET | Refills: 0 | Status: SHIPPED | OUTPATIENT
Start: 2018-02-22 | End: 2018-02-28

## 2018-02-22 RX ORDER — SIMVASTATIN 20 MG
TABLET ORAL
Qty: 30 TABLET | Refills: 0 | Status: SHIPPED | OUTPATIENT
Start: 2018-02-22 | End: 2018-02-28

## 2018-02-22 NOTE — TELEPHONE ENCOUNTER
Medication is being filled for 1 time refill only due to:  Patient needs to be seen for fasting lab appointment and appointment with the provider for further refills.  Christine Hernandez RN

## 2018-02-28 ENCOUNTER — OFFICE VISIT (OUTPATIENT)
Dept: FAMILY MEDICINE | Facility: CLINIC | Age: 73
End: 2018-02-28
Payer: COMMERCIAL

## 2018-02-28 VITALS
OXYGEN SATURATION: 99 % | HEIGHT: 67 IN | BODY MASS INDEX: 25.58 KG/M2 | HEART RATE: 63 BPM | RESPIRATION RATE: 12 BRPM | TEMPERATURE: 97.1 F | DIASTOLIC BLOOD PRESSURE: 75 MMHG | WEIGHT: 163 LBS | SYSTOLIC BLOOD PRESSURE: 131 MMHG

## 2018-02-28 DIAGNOSIS — I10 ESSENTIAL HYPERTENSION WITH GOAL BLOOD PRESSURE LESS THAN 140/90: ICD-10-CM

## 2018-02-28 DIAGNOSIS — E78.5 HYPERLIPIDEMIA WITH TARGET LDL LESS THAN 100: ICD-10-CM

## 2018-02-28 LAB
ANION GAP SERPL CALCULATED.3IONS-SCNC: 11 MMOL/L (ref 3–14)
BUN SERPL-MCNC: 17 MG/DL (ref 7–30)
CALCIUM SERPL-MCNC: 9.4 MG/DL (ref 8.5–10.1)
CHLORIDE SERPL-SCNC: 100 MMOL/L (ref 94–109)
CO2 SERPL-SCNC: 25 MMOL/L (ref 20–32)
CREAT SERPL-MCNC: 1.01 MG/DL (ref 0.66–1.25)
GFR SERPL CREATININE-BSD FRML MDRD: 73 ML/MIN/1.7M2
GLUCOSE SERPL-MCNC: 92 MG/DL (ref 70–99)
POTASSIUM SERPL-SCNC: 4.4 MMOL/L (ref 3.4–5.3)
SODIUM SERPL-SCNC: 136 MMOL/L (ref 133–144)

## 2018-02-28 PROCEDURE — 99213 OFFICE O/P EST LOW 20 MIN: CPT | Performed by: FAMILY MEDICINE

## 2018-02-28 PROCEDURE — 36415 COLL VENOUS BLD VENIPUNCTURE: CPT | Performed by: FAMILY MEDICINE

## 2018-02-28 PROCEDURE — 80048 BASIC METABOLIC PNL TOTAL CA: CPT | Performed by: FAMILY MEDICINE

## 2018-02-28 RX ORDER — AMLODIPINE BESYLATE 10 MG/1
10 TABLET ORAL DAILY
Qty: 90 TABLET | Refills: 1 | Status: SHIPPED | OUTPATIENT
Start: 2018-02-28 | End: 2018-10-09

## 2018-02-28 RX ORDER — LOSARTAN POTASSIUM 25 MG/1
25 TABLET ORAL DAILY
Qty: 90 TABLET | Refills: 1 | Status: SHIPPED | OUTPATIENT
Start: 2018-02-28 | End: 2018-10-09

## 2018-02-28 RX ORDER — SIMVASTATIN 20 MG
TABLET ORAL
Qty: 90 TABLET | Refills: 1 | Status: SHIPPED | OUTPATIENT
Start: 2018-02-28 | End: 2018-10-09

## 2018-02-28 RX ORDER — HYDROCHLOROTHIAZIDE 25 MG/1
TABLET ORAL
Qty: 90 TABLET | Refills: 1 | Status: SHIPPED | OUTPATIENT
Start: 2018-02-28 | End: 2018-10-09

## 2018-02-28 ASSESSMENT — PAIN SCALES - GENERAL: PAINLEVEL: NO PAIN (0)

## 2018-02-28 ASSESSMENT — ENCOUNTER SYMPTOMS: HYPERTENSION: 1

## 2018-02-28 NOTE — MR AVS SNAPSHOT
After Visit Summary   2/28/2018    José Luis Harrison    MRN: [id number]           Patient Information     Date Of Birth          1945        Visit Information        Provider Department      2/28/2018 12:00 PM Jose Carrillo MD [facility]        Today's Diagnoses     Essential hypertension with goal blood pressure less than 140/90        Hyperlipidemia with target LDL less than 100           Follow-ups after your visit        Future tests that were ordered for you today     Open Future Orders        Priority Expected Expires Ordered    Lipid panel reflex to direct LDL Fasting Routine  2/28/2019 2/28/2018            Who to contact     If you have questions or need follow up information about today's clinic visit or your schedule please contact [facility] directly at [phone].  Normal or non-critical lab and imaging results will be communicated to you by Tapgagehart, letter or phone within 4 business days after the clinic has received the results. If you do not hear from us within 7 days, please contact the clinic through Tapgagehart or phone. If you have a critical or abnormal lab result, we will notify you by phone as soon as possible.  Submit refill requests through [employer] or call your pharmacy and they will forward the refill request to us. Please allow 3 business days for your refill to be completed.          Additional Information About Your Visit        MyChart Information     [employer] gives you secure access to your electronic health record. If you see a primary care provider, you can also send messages to your care team and make appointments. If you have questions, please call your primary care clinic.  If you do not have a primary care provider, please call [phone] and they will assist you.        Care EveryWhere ID     This is your Care EveryWhere ID. This could be used by other organizations to access your [place] medical records  [id number]       "  Your Vitals Were     Pulse Temperature Respirations Height Pulse Oximetry BMI (Body Mass Index)    63 97.1  F (36.2  C) (Oral) 12 5' 6.5\" (1.689 m) 99% 25.91 kg/m2       Blood Pressure from Last 3 Encounters:   02/28/18 131/75   07/10/17 121/70   03/20/17 134/75    Weight from Last 3 Encounters:   02/28/18 163 lb (73.9 kg)   07/10/17 155 lb 6.4 oz (70.5 kg)   03/20/17 161 lb 12.8 oz (73.4 kg)              We Performed the Following     Basic metabolic panel          Today's Medication Changes          These changes are accurate as of 2/28/18 12:11 PM.  If you have any questions, ask your nurse or doctor.               These medicines have changed or have updated prescriptions.        Dose/Directions    amLODIPine 10 MG tablet   Commonly known as:  NORVASC   This may have changed:  See the new instructions.   Used for:  Essential hypertension with goal blood pressure less than 140/90   Changed by:  Jose Carrillo MD        Dose:  10 mg   Take 1 tablet (10 mg) by mouth daily   Quantity:  90 tablet   Refills:  1       hydrochlorothiazide 25 MG tablet   Commonly known as:  HYDRODIURIL   This may have changed:  See the new instructions.   Used for:  Essential hypertension with goal blood pressure less than 140/90   Changed by:  Jose Carrillo MD        TAKE ONE TABLET BY MOUTH ONCE DAILY.   Quantity:  90 tablet   Refills:  1       losartan 25 MG tablet   Commonly known as:  COZAAR   This may have changed:  See the new instructions.   Used for:  Essential hypertension with goal blood pressure less than 140/90   Changed by:  Jose Carrillo MD        Dose:  25 mg   Take 1 tablet (25 mg) by mouth daily   Quantity:  90 tablet   Refills:  1       simvastatin 20 MG tablet   Commonly known as:  ZOCOR   This may have changed:  See the new instructions.   Used for:  Hyperlipidemia with target LDL less than 100   Changed by:  Jose Carrillo MD        take 1 tablet by mouth daily at bedtime "   Quantity:  90 tablet   Refills:  1            Where to get your medicines      These medications were sent to [id number] - [place], [place] - [address]  [address] [place] [address]     Phone:  [phone]     amLODIPine 10 MG tablet    hydrochlorothiazide 25 MG tablet    losartan 25 MG tablet    simvastatin 20 MG tablet                Primary Care Provider Office Phone # Fax #    Jose Carrillo [phone] [address] [address]        Equal Access to Services     [facility]: Hadii aad ku hadasho Soomaali, waaxda luqadaha, qaybta kaalmada adeegyada, waxay idiin hayaan adeeg iliana jaeger . So [facility] [phone].    ATENCIÓN: Si habla español, tiene a ness disposición servicios gratuitos de asistencia lingüística. [facility] [phone].    We comply with applicable federal civil rights laws and [place] laws. We do not discriminate on the basis of race, color, national origin, age, disability, sex, sexual orientation, or gender identity.            Thank you!     Thank you for choosing [facility]  for your care. Our goal is always to provide you with excellent care. Hearing back from our patients is one way we can continue to improve our services. Please take a few minutes to complete the written survey that you may receive in the mail after your visit with us. Thank you!             Your Updated Medication List - Protect others around you: Learn how to safely use, store and throw away your medicines at www.disposemymeds.org.          This list is accurate as of 2/28/18 12:11 PM.  Always use your most recent med list.                   Brand Name Dispense Instructions for use Diagnosis    amLODIPine 10 MG tablet    NORVASC    90 tablet    Take 1 tablet (10 mg) by mouth daily    Essential hypertension with goal blood pressure less than 140/90       aspirin 325 MG EC tablet      Take 325 mg by mouth daily.         hydrochlorothiazide 25 MG tablet    HYDRODIURIL    90 tablet    TAKE ONE TABLET BY MOUTH ONCE DAILY.    Essential hypertension with goal blood pressure less than 140/90       losartan 25 MG tablet    COZAAR    90 tablet    Take 1 tablet (25 mg) by mouth daily    Essential hypertension with goal blood pressure less than 140/90       simvastatin 20 MG tablet    ZOCOR    90 tablet    take 1 tablet by mouth daily at bedtime    Hyperlipidemia with target LDL less than 100

## 2018-02-28 NOTE — NURSING NOTE
"Chief Complaint   Patient presents with     Hypertension       Initial /75  Pulse 63  Temp 97.1  F (36.2  C) (Oral)  Resp 12  Ht 5' 6.5\" (1.689 m)  Wt 163 lb (73.9 kg)  SpO2 99%  BMI 25.91 kg/m2 Estimated body mass index is 25.91 kg/(m^2) as calculated from the following:    Height as of this encounter: 5' 6.5\" (1.689 m).    Weight as of this encounter: 163 lb (73.9 kg).  Medication Reconciliation: complete  Nolan Rodriguez CMA    "

## 2018-02-28 NOTE — PROGRESS NOTES
SUBJECTIVE:   José Luis Harrison is a 72 year old male who presents to clinic today for the following health issues:      Hypertension      History of Present Illness     Diet:  Other  Frequency of exercise:  None  Taking medications regularly:  Yes  Medication side effects:  None  Additional concerns today:  No    Answers for HPI/ROS submitted by the patient on 2/28/2018   PHQ-2 Score: 0  SUBJECTIVE:  72 year oldyear old male enters with a hx of hypertension.  Pt. Has been compliant with medications and medications were reviewed.  No side effects. No chest pain or sob. Low sodium diet.    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     Hypertension goal BP (blood pressure) < 140/90 4/10/2013     TB (pulmonary tuberculosis) 2010     Unspecified cerebral artery occlusion with cerebral infarction      Office Visit on 07/10/2017   Component Date Value Ref Range Status     Cholesterol 07/10/2017 172  <200 mg/dL Final     Triglycerides 07/10/2017 37  <150 mg/dL Final    Non Fasting     HDL Cholesterol 07/10/2017 77  >39 mg/dL Final     LDL Cholesterol Calculated 07/10/2017 88  <100 mg/dL Final    Desirable:       <100 mg/dl     Non HDL Cholesterol 07/10/2017 95  <130 mg/dL Final     Creatinine Urine 07/10/2017 76  mg/dL Final     Albumin Urine mg/L 07/10/2017 <5  mg/L Final     Albumin Urine mg/g Cr 07/10/2017 Unable to calculate due to low value  0 - 17 mg/g Cr Final  "     Reviewed health maintenance  Patient Active Problem List   Diagnosis     Heart murmur     Corneal opacity     Advanced directives, counseling/discussion     Transient cerebral ischemia     Tuberculosis, treated     Hyperlipidemia with target LDL less than 100     Tobacco abuse     Cerebral artery occlusion with cerebral infarction (H)     TB (pulmonary tuberculosis)     Essential hypertension     Essential hypertension with goal blood pressure less than 140/90         OBJECTIVE:  no apparent distress  /75  Pulse 63  Temp 97.1  F (36.2  C) (Oral)  Resp 12  Ht 5' 6.5\" (1.689 m)  Wt 163 lb (73.9 kg)  SpO2 99%  BMI 25.91 kg/m2     Head: Normocephalic. No masses, lesions, tenderness or abnormalities.  Neck::Neck supple. No adenopathy. Thyroid symmetric, normal size.    Cardiovascular: negative. No lifts, heaves, or thrills. RRR. No murmurs, clicks gallops or rubs  Respiratory. Good diaphragmatic excursion. Lungs clear  Gastrointestinal:Abdomen soft, non-tender.  No masses, organomegaly    Office Visit on 07/10/2017   Component Date Value Ref Range Status     Cholesterol 07/10/2017 172  <200 mg/dL Final     Triglycerides 07/10/2017 37  <150 mg/dL Final    Non Fasting     HDL Cholesterol 07/10/2017 77  >39 mg/dL Final     LDL Cholesterol Calculated 07/10/2017 88  <100 mg/dL Final    Desirable:       <100 mg/dl     Non HDL Cholesterol 07/10/2017 95  <130 mg/dL Final     Creatinine Urine 07/10/2017 76  mg/dL Final     Albumin Urine mg/L 07/10/2017 <5  mg/L Final     Albumin Urine mg/g Cr 07/10/2017 Unable to calculate due to low value  0 - 17 mg/g Cr Final         ICD-10-CM    1. Essential hypertension with goal blood pressure less than 140/90 I10 losartan (COZAAR) 25 MG tablet     amLODIPine (NORVASC) 10 MG tablet     hydrochlorothiazide (HYDRODIURIL) 25 MG tablet   2. Hyperlipidemia with target LDL less than 100 E78.5 simvastatin (ZOCOR) 20 MG tablet    PLAN: Follow up in 6 months         "

## 2018-06-01 ENCOUNTER — TELEPHONE (OUTPATIENT)
Dept: FAMILY MEDICINE | Facility: CLINIC | Age: 73
End: 2018-06-01

## 2018-06-01 NOTE — TELEPHONE ENCOUNTER
Panel Management Review      Patient has the following on his problem list:       IVD   ASA: Passed    Last LDL:    Lab Results   Component Value Date    CHOL 172 07/10/2017     Lab Results   Component Value Date    HDL 77 07/10/2017     Lab Results   Component Value Date    LDL 88 07/10/2017     Lab Results   Component Value Date    TRIG 37 07/10/2017        Lab Results   Component Value Date    CHOLHDLRATIO 2.1 06/08/2015        Is the patient on a Statin? YES   Is the patient on Aspirin? YES                  Medications     HMG CoA Reductase Inhibitors    simvastatin (ZOCOR) 20 MG tablet    Salicylates    aspirin 325 MG EC tablet          Last three blood pressure readings:  BP Readings from Last 3 Encounters:   02/28/18 131/75   07/10/17 121/70   03/20/17 134/75        Tobacco History:     History   Smoking Status     Current Every Day Smoker     Packs/day: 1.00     Years: 50.00     Last attempt to quit: 10/19/2011   Smokeless Tobacco     Never Used         Composite cancer screening  Chart review shows that this patient is due/due soon for the following None  Summary:    Patient is due/failing the following:   Patient was seen on 2/28/18 was to follow up in August per office note.     Action needed:   No action needed at this time    Type of outreach:    none    Questions for provider review:    None                                                                                                                                    TABBY Jaime       Chart routed to Closed .

## 2018-10-02 ENCOUNTER — TELEPHONE (OUTPATIENT)
Dept: FAMILY MEDICINE | Facility: CLINIC | Age: 73
End: 2018-10-02

## 2018-10-09 DIAGNOSIS — E78.5 HYPERLIPIDEMIA WITH TARGET LDL LESS THAN 100: ICD-10-CM

## 2018-10-09 DIAGNOSIS — I10 ESSENTIAL HYPERTENSION WITH GOAL BLOOD PRESSURE LESS THAN 140/90: ICD-10-CM

## 2018-10-10 RX ORDER — LOSARTAN POTASSIUM 25 MG/1
TABLET ORAL
Qty: 30 TABLET | Refills: 0 | Status: SHIPPED | OUTPATIENT
Start: 2018-10-10 | End: 2018-11-12

## 2018-10-10 RX ORDER — SIMVASTATIN 20 MG
TABLET ORAL
Qty: 30 TABLET | Refills: 0 | Status: SHIPPED | OUTPATIENT
Start: 2018-10-10 | End: 2018-11-12

## 2018-10-10 RX ORDER — HYDROCHLOROTHIAZIDE 25 MG/1
TABLET ORAL
Qty: 30 TABLET | Refills: 0 | Status: SHIPPED | OUTPATIENT
Start: 2018-10-10 | End: 2018-11-12

## 2018-10-10 RX ORDER — AMLODIPINE BESYLATE 10 MG/1
TABLET ORAL
Qty: 30 TABLET | Refills: 0 | Status: SHIPPED | OUTPATIENT
Start: 2018-10-10 | End: 2018-11-12

## 2018-10-10 NOTE — TELEPHONE ENCOUNTER
Medication is being filled for 1 time refill only due to:  Patient needs to be seen for fasting lab appointment and appointment with the provider for further refills.  Hypertension and cholesterol.  Christine SULLIVANN, RN

## 2018-11-12 ENCOUNTER — OFFICE VISIT (OUTPATIENT)
Dept: FAMILY MEDICINE | Facility: CLINIC | Age: 73
End: 2018-11-12
Payer: COMMERCIAL

## 2018-11-12 VITALS
WEIGHT: 161 LBS | DIASTOLIC BLOOD PRESSURE: 70 MMHG | SYSTOLIC BLOOD PRESSURE: 126 MMHG | OXYGEN SATURATION: 99 % | HEART RATE: 66 BPM | HEIGHT: 66 IN | RESPIRATION RATE: 16 BRPM | BODY MASS INDEX: 25.88 KG/M2

## 2018-11-12 DIAGNOSIS — E78.00 HIGH BLOOD CHOLESTEROL: Primary | ICD-10-CM

## 2018-11-12 DIAGNOSIS — I10 ESSENTIAL HYPERTENSION WITH GOAL BLOOD PRESSURE LESS THAN 140/90: ICD-10-CM

## 2018-11-12 PROCEDURE — 99214 OFFICE O/P EST MOD 30 MIN: CPT | Performed by: FAMILY MEDICINE

## 2018-11-12 RX ORDER — LOSARTAN POTASSIUM 25 MG/1
TABLET ORAL
Qty: 90 TABLET | Refills: 1 | Status: SHIPPED | OUTPATIENT
Start: 2018-11-12

## 2018-11-12 RX ORDER — AMLODIPINE BESYLATE 10 MG/1
TABLET ORAL
Qty: 90 TABLET | Refills: 1 | Status: SHIPPED | OUTPATIENT
Start: 2018-11-12

## 2018-11-12 RX ORDER — HYDROCHLOROTHIAZIDE 25 MG/1
TABLET ORAL
Qty: 90 TABLET | Refills: 0 | Status: SHIPPED | OUTPATIENT
Start: 2018-11-12 | End: 2019-02-19

## 2018-11-12 RX ORDER — SIMVASTATIN 20 MG
TABLET ORAL
Qty: 90 TABLET | Refills: 3 | Status: SHIPPED | OUTPATIENT
Start: 2018-11-12

## 2018-11-12 ASSESSMENT — PAIN SCALES - GENERAL: PAINLEVEL: NO PAIN (0)

## 2018-11-12 NOTE — MR AVS SNAPSHOT
"              After Visit Summary   11/12/2018    José Luis Harrison    MRN: [id number]           Patient Information     Date Of Birth          1945        Visit Information        Provider Department      11/12/2018 10:15 AM Jose Carrillo MD [facility]        Today's Diagnoses     High blood cholesterol    -  1    Essential hypertension with goal blood pressure less than 140/90           Follow-ups after your visit        Who to contact     If you have questions or need follow up information about today's clinic visit or your schedule please contact [facility] directly at [phone].  Normal or non-critical lab and imaging results will be communicated to you by [employer], letter or phone within 4 business days after the clinic has received the results. If you do not hear from us within 7 days, please contact the clinic through [employer] or phone. If you have a critical or abnormal lab result, we will notify you by phone as soon as possible.  Submit refill requests through [employer] or call your pharmacy and they will forward the refill request to us. Please allow 3 business days for your refill to be completed.          Additional Information About Your Visit        MyChart Information     [employer] gives you secure access to your electronic health record. If you see a primary care provider, you can also send messages to your care team and make appointments. If you have questions, please call your primary care clinic.  If you do not have a primary care provider, please call [phone] and they will assist you.        Care EveryWhere ID     This is your Care EveryWhere ID. This could be used by other organizations to access your [place] medical records  [id number]        Your Vitals Were     Pulse Respirations Height Pulse Oximetry BMI (Body Mass Index)       66 16 5' 6\" (1.676 m) 99% 25.99 kg/m2        Blood Pressure from Last 3 Encounters:   11/12/18 126/70   02/28/18 131/75 "   07/10/17 121/70    Weight from Last 3 Encounters:   11/12/18 161 lb (73 kg)   02/28/18 163 lb (73.9 kg)   07/10/17 155 lb 6.4 oz (70.5 kg)              We Performed the Following     Basic metabolic panel  (Ca, Cl, CO2, Creat, Gluc, K, Na, BUN)          Where to get your medicines      These medications were sent to [id number] - [place], [place] - [address]  [address] [address] [facility] [place] [address]     Phone:  [phone]     amLODIPine 10 MG tablet    hydrochlorothiazide 25 MG tablet    losartan 25 MG tablet    simvastatin 20 MG tablet          Primary Care Provider Office Phone # Fax #    Jose Carrillo [phone] [address] [address]        Equal Access to Services     [facility]: Hadii julito archibald hadasho Soomaali, waaxda luqadaha, qaybta kaalmada adeegyada, kenyatta whaley haycrissy jaeger . So [facility] [phone].    ATENCIÓN: Si habla español, tiene a ness disposición servicios gratuitos de asistencia lingüística. Llame al [phone].    We comply with applicable federal civil rights laws and [place] laws. We do not discriminate on the basis of race, color, national origin, age, disability, sex, sexual orientation, or gender identity.            Thank you!     Thank you for choosing [facility]  for your care. Our goal is always to provide you with excellent care. Hearing back from our patients is one way we can continue to improve our services. Please take a few minutes to complete the written survey that you may receive in the mail after your visit with us. Thank you!             Your Updated Medication List - Protect others around you: Learn how to safely use, store and throw away your medicines at www.disposemymeds.org.          This list is accurate as of 11/12/18 10:35 AM.  Always use your most recent med list.                   Brand Name Dispense Instructions for use Diagnosis    amLODIPine 10 MG tablet     NORVASC    90 tablet    Take 1 tablet (10 mg) by mouth daily    Essential hypertension with goal blood pressure less than 140/90       aspirin 325 MG EC tablet      Take 325 mg by mouth daily.        hydrochlorothiazide 25 MG tablet    HYDRODIURIL    90 tablet    TAKE ONE TABLET BY MOUTH ONCE DAILY.    Essential hypertension with goal blood pressure less than 140/90       losartan 25 MG tablet    COZAAR    90 tablet    Take 1 tablet (25 mg) by mouth daily    Essential hypertension with goal blood pressure less than 140/90       simvastatin 20 MG tablet    ZOCOR    90 tablet    take 1 tablet by mouth daily at bedtime    Essential hypertension with goal blood pressure less than 140/90

## 2018-11-12 NOTE — NURSING NOTE
"Chief Complaint   Patient presents with     Hypertension       Initial /70  Pulse 66  Resp 16  Ht 5' 6\" (1.676 m)  Wt 161 lb (73 kg)  SpO2 99%  BMI 25.99 kg/m2 Estimated body mass index is 25.99 kg/(m^2) as calculated from the following:    Height as of this encounter: 5' 6\" (1.676 m).    Weight as of this encounter: 161 lb (73 kg).  Medication Reconciliation: complete  Mary Herzog M.A.    "

## 2018-11-12 NOTE — PROGRESS NOTES
SUBJECTIVE:  73 year oldyear old male enters with  hypertension.  Pt. Has been compliant with medications and medications were reviewed.  No side effects. No chest pain or sob. Low sodium diet.    Current Outpatient Prescriptions:      amLODIPine (NORVASC) 10 MG tablet, Take 1 tablet (10 mg) by mouth daily, Disp: 90 tablet, Rfl: 1     aspirin 325 MG EC tablet, Take 325 mg by mouth daily., Disp: , Rfl:      hydrochlorothiazide (HYDRODIURIL) 25 MG tablet, TAKE ONE TABLET BY MOUTH ONCE DAILY., Disp: 90 tablet, Rfl: 0     losartan (COZAAR) 25 MG tablet, Take 1 tablet (25 mg) by mouth daily, Disp: 90 tablet, Rfl: 1     simvastatin (ZOCOR) 20 MG tablet, take 1 tablet by mouth daily at bedtime, Disp: 90 tablet, Rfl: 3     [DISCONTINUED] amLODIPine (NORVASC) 10 MG tablet, Take 1 tablet (10 mg) by mouth daily, Disp: 30 tablet, Rfl: 0     [DISCONTINUED] hydrochlorothiazide (HYDRODIURIL) 25 MG tablet, TAKE ONE TABLET BY MOUTH ONCE DAILY., Disp: 30 tablet, Rfl: 0     [DISCONTINUED] losartan (COZAAR) 25 MG tablet, Take 1 tablet (25 mg) by mouth daily, Disp: 30 tablet, Rfl: 0     [DISCONTINUED] simvastatin (ZOCOR) 20 MG tablet, take 1 tablet by mouth daily at bedtime, Disp: 30 tablet, Rfl: 0  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     Hypertension goal BP (blood pressure) < 140/90 4/10/2013     TB (pulmonary tuberculosis) 2010     Unspecified cerebral artery occlusion with cerebral infarction      Office Visit on 02/28/2018   Component Date Value Ref Range Status     Sodium 02/28/2018 136  133 - 144 mmol/L Final     Potassium 02/28/2018 4.4  3.4 - 5.3 mmol/L Final     Chloride 02/28/2018 100  94 - 109 mmol/L Final     Carbon Dioxide 02/28/2018 25  20 - 32 mmol/L Final     Anion Gap 02/28/2018 11  3 - 14 mmol/L Final     Glucose 02/28/2018 92  70 - 99 mg/dL Final    Non Fasting     Urea Nitrogen 02/28/2018 17  7 - 30 mg/dL Final     Creatinine 02/28/2018 1.01  0.66 -  "1.25 mg/dL Final     GFR Estimate 02/28/2018 73  >60 mL/min/1.7m2 Final    Non  GFR Calc     GFR Estimate If Black 02/28/2018 88  >60 mL/min/1.7m2 Final    African American GFR Calc     Calcium 02/28/2018 9.4  8.5 - 10.1 mg/dL Final      Reviewed health maintenance  Patient Active Problem List   Diagnosis     Heart murmur     Corneal opacity     Advanced directives, counseling/discussion     Transient cerebral ischemia     Tuberculosis, treated     Hyperlipidemia with target LDL less than 100     Tobacco abuse     Cerebral artery occlusion with cerebral infarction (H)     TB (pulmonary tuberculosis)     Essential hypertension     Essential hypertension with goal blood pressure less than 140/90     High blood cholesterol         OBJECTIVE:  no apparent distress  /70  Pulse 66  Resp 16  Ht 5' 6\" (1.676 m)  Wt 161 lb (73 kg)  SpO2 99%  BMI 25.99 kg/m2     Head: Normocephalic. No masses, lesions, tenderness or abnormalities.  Neck::Neck supple. No adenopathy. Thyroid symmetric, normal size.    Cardiovascular: negative. No lifts, heaves, or thrills. RRR. No murmurs, clicks gallops or rubs  Respiratory. Good diaphragmatic excursion. Lungs clear  Gastrointestinal:Abdomen soft, non-tender.  No masses, organomegaly    Office Visit on 02/28/2018   Component Date Value Ref Range Status     Sodium 02/28/2018 136  133 - 144 mmol/L Final     Potassium 02/28/2018 4.4  3.4 - 5.3 mmol/L Final     Chloride 02/28/2018 100  94 - 109 mmol/L Final     Carbon Dioxide 02/28/2018 25  20 - 32 mmol/L Final     Anion Gap 02/28/2018 11  3 - 14 mmol/L Final     Glucose 02/28/2018 92  70 - 99 mg/dL Final    Non Fasting     Urea Nitrogen 02/28/2018 17  7 - 30 mg/dL Final     Creatinine 02/28/2018 1.01  0.66 - 1.25 mg/dL Final     GFR Estimate 02/28/2018 73  >60 mL/min/1.7m2 Final    Non  GFR Calc     GFR Estimate If Black 02/28/2018 88  >60 mL/min/1.7m2 Final    African American GFR Calc     Calcium " 02/28/2018 9.4  8.5 - 10.1 mg/dL Final         ICD-10-CM    1. High blood cholesterol E78.00    2. Essential hypertension with goal blood pressure less than 140/90 I10 amLODIPine (NORVASC) 10 MG tablet     hydrochlorothiazide (HYDRODIURIL) 25 MG tablet     losartan (COZAAR) 25 MG tablet     simvastatin (ZOCOR) 20 MG tablet     Basic metabolic panel  (Ca, Cl, CO2, Creat, Gluc, K, Na, BUN)    PLAN: Follow up in 6 months    SUBJECTIVE:  73 year old enters for recheck of high cholesterol.  Pt. Has been taking med and has no side effects. Pt is following diet.  Denies chest pain and SOB.  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     Hypertension goal BP (blood pressure) < 140/90 4/10/2013     TB (pulmonary tuberculosis) 2010     Unspecified cerebral artery occlusion with cerebral infarction      Past Surgical History:   Procedure Laterality Date     ABDOMEN SURGERY  1975    peptic ulcer surgery     SURGICAL HISTORY OF -   1975    PEPTIC ULCER       Current Outpatient Prescriptions:      amLODIPine (NORVASC) 10 MG tablet, Take 1 tablet (10 mg) by mouth daily, Disp: 90 tablet, Rfl: 1     aspirin 325 MG EC tablet, Take 325 mg by mouth daily., Disp: , Rfl:      hydrochlorothiazide (HYDRODIURIL) 25 MG tablet, TAKE ONE TABLET BY MOUTH ONCE DAILY., Disp: 90 tablet, Rfl: 0     losartan (COZAAR) 25 MG tablet, Take 1 tablet (25 mg) by mouth daily, Disp: 90 tablet, Rfl: 1     simvastatin (ZOCOR) 20 MG tablet, take 1 tablet by mouth daily at bedtime, Disp: 90 tablet, Rfl: 3     [DISCONTINUED] amLODIPine (NORVASC) 10 MG tablet, Take 1 tablet (10 mg) by mouth daily, Disp: 30 tablet, Rfl: 0     [DISCONTINUED] hydrochlorothiazide (HYDRODIURIL) 25 MG tablet, TAKE ONE TABLET BY MOUTH ONCE DAILY., Disp: 30 tablet, Rfl: 0     [DISCONTINUED] losartan (COZAAR) 25 MG tablet, Take 1 tablet (25 mg) by mouth daily, Disp: 30 tablet, Rfl: 0     [DISCONTINUED] simvastatin (ZOCOR) 20 MG  "tablet, take 1 tablet by mouth daily at bedtime, Disp: 30 tablet, Rfl: 0  Reviewed health maintenance   Patient Active Problem List   Diagnosis     Heart murmur     Corneal opacity     Advanced directives, counseling/discussion     Transient cerebral ischemia     Tuberculosis, treated     Hyperlipidemia with target LDL less than 100     Tobacco abuse     Cerebral artery occlusion with cerebral infarction (H)     TB (pulmonary tuberculosis)     Essential hypertension     Essential hypertension with goal blood pressure less than 140/90     High blood cholesterol       OBJECTIVE:  no apparent distress  /70  Pulse 66  Resp 16  Ht 5' 6\" (1.676 m)  Wt 161 lb (73 kg)  SpO2 99%  BMI 25.99 kg/m2      Exam:  Constitutional: healthy, alert and no distress  Head: Normocephalic. No masses, lesions, tenderness or abnormalities  Neck: Neck supple. No adenopathy. Thyroid symmetric, normal size,  Cardiovascular: negative, PMI normal. No lifts, heaves, or thrills. RRR. No murmurs, clicks gallops or rub  Respiratory: negative Lungs clear    Office Visit on 02/28/2018   Component Date Value Ref Range Status     Sodium 02/28/2018 136  133 - 144 mmol/L Final     Potassium 02/28/2018 4.4  3.4 - 5.3 mmol/L Final     Chloride 02/28/2018 100  94 - 109 mmol/L Final     Carbon Dioxide 02/28/2018 25  20 - 32 mmol/L Final     Anion Gap 02/28/2018 11  3 - 14 mmol/L Final     Glucose 02/28/2018 92  70 - 99 mg/dL Final    Non Fasting     Urea Nitrogen 02/28/2018 17  7 - 30 mg/dL Final     Creatinine 02/28/2018 1.01  0.66 - 1.25 mg/dL Final     GFR Estimate 02/28/2018 73  >60 mL/min/1.7m2 Final    Non  GFR Calc     GFR Estimate If Black 02/28/2018 88  >60 mL/min/1.7m2 Final    African American GFR Calc     Calcium 02/28/2018 9.4  8.5 - 10.1 mg/dL Final           ICD-10-CM    1. High blood cholesterol E78.00    2. Essential hypertension with goal blood pressure less than 140/90 I10 amLODIPine (NORVASC) 10 MG tablet     " hydrochlorothiazide (HYDRODIURIL) 25 MG tablet     losartan (COZAAR) 25 MG tablet     simvastatin (ZOCOR) 20 MG tablet     Basic metabolic panel  (Ca, Cl, CO2, Creat, Gluc, K, Na, BUN)    PLAN: Follow up in 1 year

## 2019-02-19 DIAGNOSIS — I10 ESSENTIAL HYPERTENSION WITH GOAL BLOOD PRESSURE LESS THAN 140/90: ICD-10-CM

## 2019-02-20 RX ORDER — HYDROCHLOROTHIAZIDE 25 MG/1
TABLET ORAL
Qty: 90 TABLET | Refills: 0 | Status: SHIPPED | OUTPATIENT
Start: 2019-02-20

## 2019-04-02 ENCOUNTER — DOCUMENTATION ONLY (OUTPATIENT)
Dept: LAB | Facility: CLINIC | Age: 74
End: 2019-04-02

## 2019-04-02 NOTE — PROGRESS NOTES
On 3/12/19, we sent an over due lab letter to this patient and he has not made an appt. The tests are now  and have been canceled. If you would like him to return to the clinic for a lab only appt, please have your team contact him and place new Future orders.    Thank you, Roselyn Morgan MLT

## 2019-12-08 ENCOUNTER — HEALTH MAINTENANCE LETTER (OUTPATIENT)
Age: 74
End: 2019-12-08

## 2020-03-15 ENCOUNTER — HEALTH MAINTENANCE LETTER (OUTPATIENT)
Age: 75
End: 2020-03-15

## 2021-01-10 ENCOUNTER — HEALTH MAINTENANCE LETTER (OUTPATIENT)
Age: 76
End: 2021-01-10

## 2021-05-08 ENCOUNTER — HEALTH MAINTENANCE LETTER (OUTPATIENT)
Age: 76
End: 2021-05-08

## 2021-10-23 ENCOUNTER — HEALTH MAINTENANCE LETTER (OUTPATIENT)
Age: 76
End: 2021-10-23

## 2022-06-04 ENCOUNTER — HEALTH MAINTENANCE LETTER (OUTPATIENT)
Age: 77
End: 2022-06-04

## 2022-10-09 ENCOUNTER — HEALTH MAINTENANCE LETTER (OUTPATIENT)
Age: 77
End: 2022-10-09

## 2023-06-10 ENCOUNTER — HEALTH MAINTENANCE LETTER (OUTPATIENT)
Age: 78
End: 2023-06-10